# Patient Record
Sex: FEMALE | Race: WHITE | NOT HISPANIC OR LATINO | Employment: UNEMPLOYED | URBAN - METROPOLITAN AREA
[De-identification: names, ages, dates, MRNs, and addresses within clinical notes are randomized per-mention and may not be internally consistent; named-entity substitution may affect disease eponyms.]

---

## 2023-11-06 ENCOUNTER — HOSPITAL ENCOUNTER (EMERGENCY)
Facility: HOSPITAL | Age: 63
Discharge: HOME | End: 2023-11-07
Attending: EMERGENCY MEDICINE
Payer: MEDICAID

## 2023-11-06 ENCOUNTER — APPOINTMENT (OUTPATIENT)
Dept: RADIOLOGY | Facility: HOSPITAL | Age: 63
End: 2023-11-06
Payer: MEDICAID

## 2023-11-06 DIAGNOSIS — M54.41 BILATERAL LOW BACK PAIN WITH BILATERAL SCIATICA, UNSPECIFIED CHRONICITY: Primary | ICD-10-CM

## 2023-11-06 DIAGNOSIS — M54.42 BILATERAL LOW BACK PAIN WITH BILATERAL SCIATICA, UNSPECIFIED CHRONICITY: Primary | ICD-10-CM

## 2023-11-06 LAB
ANION GAP SERPL CALC-SCNC: 20 MMOL/L (ref 10–20)
APPEARANCE UR: ABNORMAL
BILIRUB UR STRIP.AUTO-MCNC: NEGATIVE MG/DL
BUN SERPL-MCNC: 11 MG/DL (ref 6–23)
CALCIUM SERPL-MCNC: 9.9 MG/DL (ref 8.6–10.6)
CHLORIDE SERPL-SCNC: 101 MMOL/L (ref 98–107)
CO2 SERPL-SCNC: 21 MMOL/L (ref 21–32)
COLOR UR: ABNORMAL
CREAT SERPL-MCNC: 0.72 MG/DL (ref 0.5–1.05)
ERYTHROCYTE [DISTWIDTH] IN BLOOD BY AUTOMATED COUNT: 13.6 % (ref 11.5–14.5)
GFR SERPL CREATININE-BSD FRML MDRD: >90 ML/MIN/1.73M*2
GLUCOSE SERPL-MCNC: 93 MG/DL (ref 74–99)
GLUCOSE UR STRIP.AUTO-MCNC: NEGATIVE MG/DL
HCT VFR BLD AUTO: 43.2 % (ref 36–46)
HGB BLD-MCNC: 13.9 G/DL (ref 12–16)
KETONES UR STRIP.AUTO-MCNC: ABNORMAL MG/DL
LEUKOCYTE ESTERASE UR QL STRIP.AUTO: ABNORMAL
MCH RBC QN AUTO: 26 PG (ref 26–34)
MCHC RBC AUTO-ENTMCNC: 32.2 G/DL (ref 32–36)
MCV RBC AUTO: 81 FL (ref 80–100)
MUCOUS THREADS #/AREA URNS AUTO: ABNORMAL /LPF
NITRITE UR QL STRIP.AUTO: NEGATIVE
NRBC BLD-RTO: 0 /100 WBCS (ref 0–0)
PH UR STRIP.AUTO: 5 [PH]
PLATELET # BLD AUTO: 566 X10*3/UL (ref 150–450)
POTASSIUM SERPL-SCNC: 3.4 MMOL/L (ref 3.5–5.3)
PROT UR STRIP.AUTO-MCNC: ABNORMAL MG/DL
RBC # BLD AUTO: 5.35 X10*6/UL (ref 4–5.2)
RBC # UR STRIP.AUTO: NEGATIVE /UL
RBC #/AREA URNS AUTO: ABNORMAL /HPF
SODIUM SERPL-SCNC: 139 MMOL/L (ref 136–145)
SP GR UR STRIP.AUTO: 1.02
SQUAMOUS #/AREA URNS AUTO: ABNORMAL /HPF
TRANS CELLS #/AREA UR COMP ASSIST: ABNORMAL /HPF
UROBILINOGEN UR STRIP.AUTO-MCNC: 4 MG/DL
WBC # BLD AUTO: 16.6 X10*3/UL (ref 4.4–11.3)
WBC #/AREA URNS AUTO: >50 /HPF

## 2023-11-06 PROCEDURE — 2500000001 HC RX 250 WO HCPCS SELF ADMINISTERED DRUGS (ALT 637 FOR MEDICARE OP)

## 2023-11-06 PROCEDURE — 85027 COMPLETE CBC AUTOMATED: CPT

## 2023-11-06 PROCEDURE — 81001 URINALYSIS AUTO W/SCOPE: CPT

## 2023-11-06 PROCEDURE — 99284 EMERGENCY DEPT VISIT MOD MDM: CPT | Mod: 25

## 2023-11-06 PROCEDURE — 99285 EMERGENCY DEPT VISIT HI MDM: CPT | Performed by: EMERGENCY MEDICINE

## 2023-11-06 PROCEDURE — 36415 COLL VENOUS BLD VENIPUNCTURE: CPT

## 2023-11-06 PROCEDURE — 99285 EMERGENCY DEPT VISIT HI MDM: CPT | Mod: 25 | Performed by: EMERGENCY MEDICINE

## 2023-11-06 PROCEDURE — 72131 CT LUMBAR SPINE W/O DYE: CPT

## 2023-11-06 PROCEDURE — 87086 URINE CULTURE/COLONY COUNT: CPT

## 2023-11-06 PROCEDURE — 72131 CT LUMBAR SPINE W/O DYE: CPT | Mod: FOREIGN READ | Performed by: RADIOLOGY

## 2023-11-06 PROCEDURE — 80048 BASIC METABOLIC PNL TOTAL CA: CPT

## 2023-11-06 RX ORDER — TRIAMTERENE/HYDROCHLOROTHIAZID 37.5-25 MG
1 TABLET ORAL DAILY
Status: DISCONTINUED | OUTPATIENT
Start: 2023-11-06 | End: 2023-11-07 | Stop reason: HOSPADM

## 2023-11-06 RX ORDER — METOPROLOL TARTRATE 50 MG/1
75 TABLET ORAL 2 TIMES DAILY
Status: DISCONTINUED | OUTPATIENT
Start: 2023-11-06 | End: 2023-11-07 | Stop reason: HOSPADM

## 2023-11-06 RX ORDER — NAPROXEN SODIUM 220 MG/1
81 TABLET, FILM COATED ORAL ONCE
Status: COMPLETED | OUTPATIENT
Start: 2023-11-06 | End: 2023-11-06

## 2023-11-06 RX ORDER — ACETAMINOPHEN 325 MG/1
975 TABLET ORAL ONCE
Status: COMPLETED | OUTPATIENT
Start: 2023-11-06 | End: 2023-11-06

## 2023-11-06 RX ADMIN — ASPIRIN 81 MG CHEWABLE TABLET 81 MG: 81 TABLET CHEWABLE at 20:41

## 2023-11-06 RX ADMIN — METOPROLOL TARTRATE 75 MG: 50 TABLET, FILM COATED ORAL at 20:41

## 2023-11-06 RX ADMIN — ACETAMINOPHEN 975 MG: 325 TABLET ORAL at 20:13

## 2023-11-06 ASSESSMENT — ENCOUNTER SYMPTOMS
WEAKNESS: 0
CHILLS: 0
NECK STIFFNESS: 0
SHORTNESS OF BREATH: 0
BACK PAIN: 1
CHOKING: 0
CHEST TIGHTNESS: 0
PALPITATIONS: 0
CONSTIPATION: 0
DIZZINESS: 0
NUMBNESS: 0
SORE THROAT: 0
APPETITE CHANGE: 0
FREQUENCY: 0
NECK PAIN: 0
RHINORRHEA: 0
TREMORS: 0
FEVER: 0
DIFFICULTY URINATING: 0
FLANK PAIN: 0
LIGHT-HEADEDNESS: 0
NAUSEA: 0
ABDOMINAL PAIN: 0
VOMITING: 0
DIARRHEA: 0

## 2023-11-06 ASSESSMENT — PAIN DESCRIPTION - DESCRIPTORS
DESCRIPTORS_2: ACHING;DISCOMFORT
DESCRIPTORS: CRUSHING;PRESSURE

## 2023-11-06 ASSESSMENT — PAIN DESCRIPTION - PAIN TYPE: TYPE: CHRONIC PAIN

## 2023-11-06 ASSESSMENT — LIFESTYLE VARIABLES
REASON UNABLE TO ASSESS: NO
EVER FELT BAD OR GUILTY ABOUT YOUR DRINKING: NO
HAVE YOU EVER FELT YOU SHOULD CUT DOWN ON YOUR DRINKING: NO
HAVE PEOPLE ANNOYED YOU BY CRITICIZING YOUR DRINKING: NO
EVER HAD A DRINK FIRST THING IN THE MORNING TO STEADY YOUR NERVES TO GET RID OF A HANGOVER: NO

## 2023-11-06 ASSESSMENT — COLUMBIA-SUICIDE SEVERITY RATING SCALE - C-SSRS
1. IN THE PAST MONTH, HAVE YOU WISHED YOU WERE DEAD OR WISHED YOU COULD GO TO SLEEP AND NOT WAKE UP?: NO
2. HAVE YOU ACTUALLY HAD ANY THOUGHTS OF KILLING YOURSELF?: NO
6. HAVE YOU EVER DONE ANYTHING, STARTED TO DO ANYTHING, OR PREPARED TO DO ANYTHING TO END YOUR LIFE?: NO

## 2023-11-06 ASSESSMENT — PAIN DESCRIPTION - LOCATION
LOCATION_2: HIP
LOCATION: BACK

## 2023-11-06 ASSESSMENT — PAIN SCALES - GENERAL
PAINLEVEL_OUTOF10: 10 - WORST POSSIBLE PAIN
PAINLEVEL_OUTOF10: 10 - WORST POSSIBLE PAIN

## 2023-11-06 ASSESSMENT — PAIN - FUNCTIONAL ASSESSMENT: PAIN_FUNCTIONAL_ASSESSMENT: 0-10

## 2023-11-06 NOTE — PROGRESS NOTES
I was requested by triage nurse to see patient for possible placement. Patient states she lives at home a lone. She is in too much pain to moving around independently anymore. Patient would like to go to a SNF for a while to get better. Patient states she does not have insurance. E-mail send to [facility] to help patient with insurance enrollment. SNF choice list given to patient for review.  Andi Bolivar RN

## 2023-11-07 VITALS
DIASTOLIC BLOOD PRESSURE: 88 MMHG | WEIGHT: 225 LBS | HEIGHT: 61 IN | OXYGEN SATURATION: 100 % | TEMPERATURE: 98.6 F | BODY MASS INDEX: 42.48 KG/M2 | RESPIRATION RATE: 16 BRPM | HEART RATE: 80 BPM | SYSTOLIC BLOOD PRESSURE: 145 MMHG

## 2023-11-07 LAB — HOLD SPECIMEN: NORMAL

## 2023-11-07 PROCEDURE — 2500000001 HC RX 250 WO HCPCS SELF ADMINISTERED DRUGS (ALT 637 FOR MEDICARE OP)

## 2023-11-07 RX ORDER — CYCLOBENZAPRINE HCL 5 MG
5 TABLET ORAL EVERY 8 HOURS
Qty: 21 TABLET | Refills: 0 | Status: SHIPPED | OUTPATIENT
Start: 2023-11-07 | End: 2023-11-14

## 2023-11-07 RX ORDER — ACETAMINOPHEN 325 MG/1
650 TABLET ORAL ONCE
Status: COMPLETED | OUTPATIENT
Start: 2023-11-07 | End: 2023-11-07

## 2023-11-07 RX ORDER — CYCLOBENZAPRINE HCL 10 MG
5 TABLET ORAL ONCE
Status: COMPLETED | OUTPATIENT
Start: 2023-11-07 | End: 2023-11-07

## 2023-11-07 RX ORDER — ACETAMINOPHEN 325 MG/1
650 TABLET ORAL EVERY 8 HOURS PRN
Qty: 30 TABLET | Refills: 0 | Status: SHIPPED | OUTPATIENT
Start: 2023-11-07 | End: 2023-11-17

## 2023-11-07 RX ADMIN — TRIAMTERENE AND HYDROCHLOROTHIAZIDE 1 TABLET: 37.5; 25 TABLET ORAL at 10:23

## 2023-11-07 RX ADMIN — TRIAMTERENE AND HYDROCHLOROTHIAZIDE 1 TABLET: 37.5; 25 TABLET ORAL at 00:16

## 2023-11-07 RX ADMIN — CYCLOBENZAPRINE 5 MG: 10 TABLET, FILM COATED ORAL at 01:56

## 2023-11-07 RX ADMIN — METOPROLOL TARTRATE 75 MG: 50 TABLET, FILM COATED ORAL at 10:22

## 2023-11-07 RX ADMIN — ACETAMINOPHEN 650 MG: 325 TABLET ORAL at 10:24

## 2023-11-07 SDOH — HEALTH STABILITY: PHYSICAL HEALTH: ON AVERAGE, HOW MANY DAYS PER WEEK DO YOU ENGAGE IN MODERATE TO STRENUOUS EXERCISE (LIKE A BRISK WALK)?: 0 DAYS

## 2023-11-07 SDOH — HEALTH STABILITY: PHYSICAL HEALTH: ON AVERAGE, HOW MANY MINUTES DO YOU ENGAGE IN EXERCISE AT THIS LEVEL?: 0 MIN

## 2023-11-07 ASSESSMENT — LIFESTYLE VARIABLES
SKIP TO QUESTIONS 9-10: 1
HOW OFTEN DO YOU HAVE A DRINK CONTAINING ALCOHOL: NEVER
HOW MANY STANDARD DRINKS CONTAINING ALCOHOL DO YOU HAVE ON A TYPICAL DAY: 1 OR 2
AUDIT-C TOTAL SCORE: 0
HOW OFTEN DO YOU HAVE SIX OR MORE DRINKS ON ONE OCCASION: NEVER

## 2023-11-07 ASSESSMENT — PAIN SCALES - GENERAL
PAINLEVEL_OUTOF10: 3
PAINLEVEL_OUTOF10: 1

## 2023-11-07 ASSESSMENT — SOCIAL DETERMINANTS OF HEALTH (SDOH)
WITHIN THE LAST YEAR, HAVE TO BEEN RAPED OR FORCED TO HAVE ANY KIND OF SEXUAL ACTIVITY BY YOUR PARTNER OR EX-PARTNER?: NO
HOW OFTEN DO YOU GET TOGETHER WITH FRIENDS OR RELATIVES?: NEVER
HOW OFTEN DO YOU ATTENT MEETINGS OF THE CLUB OR ORGANIZATION YOU BELONG TO?: NEVER
HOW OFTEN DO YOU ATTEND CHURCH OR RELIGIOUS SERVICES?: NEVER
HOW HARD IS IT FOR YOU TO PAY FOR THE VERY BASICS LIKE FOOD, HOUSING, MEDICAL CARE, AND HEATING?: VERY HARD
IN A TYPICAL WEEK, HOW MANY TIMES DO YOU TALK ON THE PHONE WITH FAMILY, FRIENDS, OR NEIGHBORS?: NEVER
WITHIN THE LAST YEAR, HAVE YOU BEEN AFRAID OF YOUR PARTNER OR EX-PARTNER?: NO
DO YOU BELONG TO ANY CLUBS OR ORGANIZATIONS SUCH AS CHURCH GROUPS UNIONS, FRATERNAL OR ATHLETIC GROUPS, OR SCHOOL GROUPS?: NO
WITHIN THE LAST YEAR, HAVE YOU BEEN HUMILIATED OR EMOTIONALLY ABUSED IN OTHER WAYS BY YOUR PARTNER OR EX-PARTNER?: NO
IN THE PAST 12 MONTHS, HAS THE ELECTRIC, GAS, OIL, OR WATER COMPANY THREATENED TO SHUT OFF SERVICE IN YOUR HOME?: YES

## 2023-11-07 ASSESSMENT — PAIN DESCRIPTION - LOCATION: LOCATION: BUTTOCKS

## 2023-11-07 ASSESSMENT — PAIN - FUNCTIONAL ASSESSMENT: PAIN_FUNCTIONAL_ASSESSMENT: 0-10

## 2023-11-07 ASSESSMENT — ACTIVITIES OF DAILY LIVING (ADL): LACK_OF_TRANSPORTATION: YES

## 2023-11-07 NOTE — ED PROVIDER NOTES
"HPI   Chief Complaint   Patient presents with    others     Social Admission, Pt state that she is Homeless and can't walk        HPI  64y/o female with PMHx significant for spinal stenosis, bone spurs, hypertension, sciatica, and CHF.     Patient reports to the ED because she reports she is in too much back pain to take care of herself and does not feel safe. She is uninsured but wants to go to a SNF to \"get better and then I can get an apartment.\" After being unable to ambulate to the bathroom, she called an ambulance to take her to the hospital from her mobile home.    She reports she has had shooting, nonradiating back pain for the last two days on top of baseline sciatica. She describes the pain as shooting in the bottom half of her spine, pulsatile/sharp, and rates the severity as 9/10 when moving and 3/10 when sitting. She states this pain impedes her ability to move around which is why she came in. She usually takes motrin or tylenol for pain but has not taken anything today.    Of note, she visited [facility] for a \"bladder infection\" one week ago, for which she received treatment which has now concluded.    Review of Systems   Constitutional:  Negative for appetite change, chills and fever.   HENT:  Negative for rhinorrhea and sore throat.    Eyes:  Negative for visual disturbance.   Respiratory:  Negative for choking, chest tightness and shortness of breath.    Cardiovascular:  Negative for chest pain, palpitations and leg swelling (Denies leg swelling, endorses swelling in feet for last few months.).   Gastrointestinal:  Negative for abdominal pain, constipation, diarrhea, nausea and vomiting.   Genitourinary:  Negative for difficulty urinating, flank pain, frequency, pelvic pain and urgency.        Denies bladder/bowel incontinence   Musculoskeletal:  Positive for back pain (Sacral pain attributed to sciatica, central shooting pain in lower half of back for last two days.). Negative for neck pain " "and neck stiffness.   Neurological:  Negative for dizziness, tremors, weakness, light-headedness and numbness.        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 Her pain precludes her from doing this now so she has not been able to afford her medication in the last few months.     -She reports she was told she likely has fibromyalgia but did not make it to \"the appointment with the specialist to confirm.\"    -She has no primary care physician but sees cardiologist Dr. Burleson at [facility]-last appointment was one year ago and next appointment is 12/18/23.         Patient History   -PMHx: sciatica, spinal stenosis, bone spurs, CHF  -Home meds:triamterene,  81mg baby aspirin daily, metoprolol 75mg BID.  -Social history: currently lives in mobile home but states she will soon be homeless because she is being evicted. She states she has a sister and children but has not seen them in years and does not trust them-thus, she declines information regarding next of kin and contact information.     Social History     Tobacco Use    Smoking status: Not on file    Smokeless tobacco: Not on file   Substance Use Topics    Alcohol use: Not on file    Drug use: Not on file       Physical Exam   ED Triage Vitals   Temp Heart Rate Resp BP   11/06/23 1324 11/06/23 1906 11/06/23 1324 11/06/23 1324   36.7 °C (98.1 °F) 87 16 155/76      SpO2 Temp src Heart Rate Source Patient Position   11/06/23 1324 -- 11/06/23 1324 11/06/23 1324   97 %  Monitor Sitting      BP Location FiO2 (%)     11/06/23 1324 --     Right arm        Physical Exam  Constitutional:       General: She is not in acute distress.  HENT:      Head: Normocephalic and atraumatic.      Nose: Nose normal.      Mouth/Throat:      Mouth: Mucous membranes are dry.      Pharynx: Oropharynx is clear.   Eyes:      Extraocular Movements: " "Extraocular movements intact.      Conjunctiva/sclera: Conjunctivae normal.   Cardiovascular:      Rate and Rhythm: Normal rate and regular rhythm.      Heart sounds: No murmur heard.  Pulmonary:      Effort: Pulmonary effort is normal.      Breath sounds: Normal breath sounds.   Abdominal:      General: There is distension (Patient attributes this to \"not taking my water pill for the last few months.\").      Palpations: Abdomen is soft.   Musculoskeletal:      Right lower leg: Edema present.      Left lower leg: Edema present.      Comments: Nonpitting edema 1+ b/L, ankles tender to palpation bilaterally.    Skin:     General: Skin is warm and dry.      Capillary Refill: Capillary refill takes less than 2 seconds.   Neurological:      General: No focal deficit present.      Mental Status: She is alert and oriented to person, place, and time.         ED Course & MDM   ED Course as of 11/06/23 2148   Mon Nov 06, 2023 2125 WBC(!): 16.6 [BH]   2125 CT lumbar spine wo IV contrast [BH]   2126 WBC(!): 16.6 [BH]      ED Course User Index  [BH] Jazmin Krishna MD   Tests ordered: CBC, BMP, UA  Imaging: CT Lumbar spine  Consults: PT, SW.    Medical Decision Making  64y/o female with PMHx significant for spinal stenosis, bone spurs, hypertension, sciatica, and CHF who presents with lumbar spine pain which she states is precluding her from her ADLs, so she presents to the ED with the hope of getting placed in a SNF to get better. She was hypertensive and afebrile on presentation, PE was remarkable for swollen feet and tender ankles b/L along with pain in spine when bending knees.     Given description of symptoms as well as her PMHx of spinal stenosis, recent UTI, and sciatica, CT Lumbar spine was ordered. Additionally, CBC, BMP, and UA were ordered. Consult to physical therapy and social work were placed. Finally, 975mg Tylenol PO given for pain along with her home medications (metoprolol, triamterene, and 81mg " aspirin).    Update: WBC elevated at 16.6, CT Lumbar Spine showed finding at right ureteral vesicle consistent with patient history of subtrigonal leilani placement. CT also showed small right kidney, which patient reports has been that way since the 1980s.These are changes consistent and expected with her medical history; CT otherwise unremarkable.          Jazmin Krishna MD  Resident  11/06/23 5601

## 2023-11-07 NOTE — PROGRESS NOTES
I spoke to patient this morning. She give me a list of her SNF picks. The patient choose [facility], [facility], [facility], [facility], [facility], and [facility]. Referrals sent to each facility via [facility].   Andi Bolivar RN

## 2023-11-07 NOTE — PROGRESS NOTES
"Carolina Charles is a 63 y.o. female on day 1 of ED admission.    Subjective   Pt said she came in yesterday with \"extreme sciatica pain\" She said she has \"pain\" in her \"feet\" that cause her to \"lock up.\" She stated her CAT scan showed \"nothing major wrong.\" She said she would like to \"gain\" her \"independence\" and \"live in an apartment.\" She said she cannot return to her residence and felt \"humiliated\" by this SW's attempt to talk about discharge planning.  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 Pt maintained her car was repossessed on September 9th and and received her letter of eviction on September 6th. Pt contends she has remained living at this residence since then.     Objective   Pt stating she would like to go to SNF. She said she understands she should explore shelter resources. SW spoke with her about having CHW Gabriela talk with her about resources. Pt maintained she had knowledge of them as she had a copy of the street card in her purse. SW noticed this was a color copy she had folded in her purse. Pt also had a copy of an eviction notice from September 6th. She said she is not yet evicted bc the land owner has to do a summons from the court downtown to evict her. When discussing potential for discharge, Pt stated she hopes she could be able to walk to be in a shelter so she is eligible to staty there. . SW asked Pt if she has a CM for housing and Pt stated she did not. 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 Pt said her inability to \"walk\" prevented her from working.       Assessment/Plan   Collaborated with [facility] Andi and [facility] staff member Vannesa regarding Pt's lack of insurance and discharge planning. [facility] stated pending in person visits from SNF's for financial eval. \"Jacquelyn\" from [facility] was present to interview Pt for financial reasons. Rosie Casarez from [facility] met with Pt " and was unable to accept for SNF. SW discussed barrier to discharge to a SNF per no insurance with [facility].   Collaborated with Dr Marcos and MAURIZIO Costa. Collaboration with returning MD from Pt's admission last night who stated Pt walked with assistance. Team agreed to asess Pt's ambulatory status to evaluate for criteria for SNF.   MD and this SW note some inconsistencies in Pt's self-reported information.   Team determined Pt able to ambulate with use of walker. MAURIZIO Costa stated Pt asking to go to shelter. SW met with pt and [facility] to obtain medications and list of free clinics. Pt stated she already had these and declined them. Pt confirmed she would like to go to [facility] arranged with [facility] as her name was not located in roundtrip service to dispatch. Pt left by lucy.    GILBERTO Anaya

## 2023-11-07 NOTE — PROGRESS NOTES
Patient was handed off to me from the previous team. For full history, physical, and prior ED course, please see previous provider note prior to patient handoff. This is an addendum to the record.     HOSPITAL COURSE/MEDICAL DECISION MAKING:  Patient is a 63-year-old female with a PMH of spinal stenosis, bone spurs, HTN, sciatica, CHF, and HTN who presents to the ED for CC of difficulty walking.  Patient reports that she is in too much back pain to care for self and does not feel safe and would like to go to SNF to get better and then she can go to an apartment.  Patient has no red flag symptoms for back pain.  Patient was recently treated for a UTI by [facility] urgent care. CT lumbar spine remarkable for degenerative changes.  BMP remarkable for mild hypokalemia 3.4.  CBC remarkable for leukocytosis and thrombocytosis.  Patient appears to have a history of chronic leukocytosis and has no physical exam findings or history that would suggest infectious etiology today.  Urinalysis not consistent with UTI and urine culture negative.  At time of signout patient was pending PT evaluation and social work consult.  Social work evaluated the patient and determined that no SNF facilities accept the patient due to no insurance.  It is unlikely that a long-term care/rehab facility will accept the patient if she can ambulate without assistance.  The patient ambulated with her walker without assistance.  Patient will be discharged  and was given a list of shelters to which she could go to.  Transportation was arranged via community care.  Patient was given a list of free clinics and [facility] for medications.  Patient was given Rx for Flexeril and Tylenol for her back pain.  She was given appropriate follow-up care with  [facility].    IMPRESSIONS:  Low back pain    DISPOSITION:   Discharge     CONDITION AT DISPOSITION:  Stable     I reviewed the patient´s case with Dr. Sung who also saw the patient and agrees with  the plan.     Musa Marcos, DO   Emergency Medicine   PGY-1

## 2023-11-07 NOTE — PROGRESS NOTES
I received Carolina Charles in signout from Dr. Jazmin Krishna MD.  Please see the previous note for all HPI, PE and MDM up to the time of signout at 0100.  This is in addition to the primary documentation.    In brief Carolina Charles is an 63 y.o. female presenting for back pain and difficulty walking over the past few days.  The patient was recently diagnosed with a urinary tract infection and was found to have a mildly increased leukocytosis but is not currently having any urinary symptoms.  CT L-spine was unremarkable for any acute findings.  The patient does have multiple social issues including an upcoming eviction.  The patient has required significant assistance with walking here in the emergency department and we are currently waiting on social work evaluation in the morning.    ED Course as of 11/07/23 0243   Mon Nov 06, 2023 2125 WBC(!): 16.6 [BH]   2125 CT lumbar spine wo IV contrast [BH]   2126 WBC(!): 16.6 [BH]   2245 Protein, Urine(!): 100 (2+) [BH]   2245 Leukocyte Esterase, Urine(!): LARGE (3+) [BH]   Tue Nov 07, 2023   0140 Patient requesting more pain medication at this time.  Patient reassessed by me continuing to have lumbar back pain.  Flexeril ordered. [RS]   0240 Patient reassessed after receiving Flexeril with significant improvement in her pain.  Patient resting comfortably in bed. [RS]      ED Course User Index  [BH] Jazmin Krishna MD  [RS] Joseph Putnam MD        MDM:  Analilia Charles is a 63-year-old female with previous medical history of spinal stenosis, hypertension, sciatic nerve pain and CHF that presents to the emergency department for back pain and difficulty walking.  The patient does not have any focal neurologic deficits on exam and her pain is improved after being provided a dose of Flexeril.  The patient was monitored for changes in vital signs and symptoms throughout my shift and she was signed out to Musa Marcos DO in stable condition pending social work evaluation for  possible placement due to her difficulty with ADLs and complicated social situation.    Assessment and plan discussed with Dr. Marixa Botello    Pt Disposition: Signed out to DO Joseph Fan MD   Emergency Medicine, PGY-1     Procedures

## 2023-11-08 LAB — BACTERIA UR CULT: NORMAL
